# Patient Record
Sex: FEMALE | Race: BLACK OR AFRICAN AMERICAN | NOT HISPANIC OR LATINO
[De-identification: names, ages, dates, MRNs, and addresses within clinical notes are randomized per-mention and may not be internally consistent; named-entity substitution may affect disease eponyms.]

---

## 2019-06-18 ENCOUNTER — APPOINTMENT (OUTPATIENT)
Dept: MAMMOGRAPHY | Facility: IMAGING CENTER | Age: 49
End: 2019-06-18
Payer: COMMERCIAL

## 2019-06-18 ENCOUNTER — OUTPATIENT (OUTPATIENT)
Dept: OUTPATIENT SERVICES | Facility: HOSPITAL | Age: 49
LOS: 1 days | End: 2019-06-18
Payer: COMMERCIAL

## 2019-06-18 ENCOUNTER — APPOINTMENT (OUTPATIENT)
Dept: ULTRASOUND IMAGING | Facility: IMAGING CENTER | Age: 49
End: 2019-06-18
Payer: COMMERCIAL

## 2019-06-18 DIAGNOSIS — R92.8 OTHER ABNORMAL AND INCONCLUSIVE FINDINGS ON DIAGNOSTIC IMAGING OF BREAST: ICD-10-CM

## 2019-06-18 PROCEDURE — 77066 DX MAMMO INCL CAD BI: CPT

## 2019-06-18 PROCEDURE — G0279: CPT

## 2019-06-18 PROCEDURE — 77067 SCR MAMMO BI INCL CAD: CPT | Mod: 26

## 2019-06-18 PROCEDURE — 77063 BREAST TOMOSYNTHESIS BI: CPT | Mod: 26

## 2019-06-18 PROCEDURE — 77063 BREAST TOMOSYNTHESIS BI: CPT

## 2019-06-18 PROCEDURE — 77067 SCR MAMMO BI INCL CAD: CPT

## 2020-08-23 ENCOUNTER — RESULT REVIEW (OUTPATIENT)
Age: 50
End: 2020-08-23

## 2021-07-23 ENCOUNTER — EMERGENCY (EMERGENCY)
Facility: HOSPITAL | Age: 51
LOS: 1 days | Discharge: HOME | End: 2021-07-23
Attending: EMERGENCY MEDICINE | Admitting: STUDENT IN AN ORGANIZED HEALTH CARE EDUCATION/TRAINING PROGRAM
Payer: COMMERCIAL

## 2021-07-23 VITALS
DIASTOLIC BLOOD PRESSURE: 82 MMHG | TEMPERATURE: 98 F | SYSTOLIC BLOOD PRESSURE: 156 MMHG | HEART RATE: 88 BPM | RESPIRATION RATE: 18 BRPM | OXYGEN SATURATION: 100 %

## 2021-07-23 PROCEDURE — 71046 X-RAY EXAM CHEST 2 VIEWS: CPT | Mod: 26

## 2021-07-23 PROCEDURE — 93010 ELECTROCARDIOGRAM REPORT: CPT | Mod: 59

## 2021-07-23 RX ORDER — ACETAMINOPHEN 500 MG
650 TABLET ORAL ONCE
Refills: 0 | Status: COMPLETED | OUTPATIENT
Start: 2021-07-23 | End: 2021-07-23

## 2021-07-23 RX ORDER — IBUPROFEN 200 MG
600 TABLET ORAL ONCE
Refills: 0 | Status: COMPLETED | OUTPATIENT
Start: 2021-07-23 | End: 2021-07-23

## 2021-07-23 NOTE — ED ADULT NURSE NOTE - OBJECTIVE STATEMENT
Patient arrives to the ED for chest pain that started this AM. A&Ox4. Denies any headache, dizziness, worsening SOB, nausea, vomiting, or radiating pain. UCG running at this time. Patient breathing even and nonlabored. 20g IV placed to left arm. Labs sent as ordered. Patient to be medicated as ordered. Safety maintained. Patient stable upon exiting the room.

## 2021-07-23 NOTE — ED PROVIDER NOTE - CARE PLAN
Principal Discharge DX:	Chest pain   Principal Discharge DX:	Chest pain  Secondary Diagnosis:	Symptomatic anemia

## 2021-07-23 NOTE — ED ADULT TRIAGE NOTE - CHIEF COMPLAINT QUOTE
Pt c/o of sudden onset of midsternal chest pain, nonradiating. States to endorse chronic intermittent sob. Denies n/v/d, fever/chills, sob. Seen in UC and received 2 aspirin around 6pm.

## 2021-07-23 NOTE — ED PROVIDER NOTE - CLINICAL SUMMARY MEDICAL DECISION MAKING FREE TEXT BOX
50 year old female with hx appendectomy presents with chest pain. HEART score 2 for age and fam hx. r/o ACS, pneumo. Discussed analgesia options with pt who requests tylenol and ibuprofen.

## 2021-07-23 NOTE — ED PROVIDER NOTE - NSFOLLOWUPINSTRUCTIONS_ED_ALL_ED_FT
Advance activity as tolerated.    Continue all previously prescribed medications as directed unless otherwise instructed.      Take ibuprofen 600mg every 8 hours as needed for pain and to reduce inflammation - take with food    May also take tylenol 500mg every 8 hours as needed for pain.    Follow up with your primary care physician in 48-72 hours - bring copies of your results.      Follow up with a cardiologist within one week - see attached list    Return to the ER for worsening or persistent symptoms including shortness of breath, pressure on your chest, weakness, and/or ANY NEW OR CONCERNING SYMPTOMS.     If you have issues obtaining follow up, please call: [phone] to obtain a doctor or specialist who takes your insurance in your area.  You may call [phone] to make an appointment with the internal medicine clinic.

## 2021-07-23 NOTE — ED PROVIDER NOTE - PROGRESS NOTE DETAILS
SKY ANTUNEZ: Hgb found to be 7.6.  Pt reports intermittent palpitations and SOB, separate from chest pain.  She notes heavy menstrual periods 2/2 known h/o fibroids.  Pt denies any black or bloody stools.  No use of blood thinners.  She states she has a known h/o anemia; intermittently takes iron supplements.  Pt placed in CDU for blood transfusion.

## 2021-07-23 NOTE — ED PROVIDER NOTE - OBJECTIVE STATEMENT
50 year old female with PMH appendectomy presents with chest pain. Reports that midsternal chest pain awoke her from sleep this morning 50 year old female with hx appendectomy presents with chest pain. Reports that 9/10 midsternal chest pain awoke her from sleep this morning, is worse with exhalation and with outstretching and backward movement of arms. "It felt like a pain in my muscle." Pain is now 7/10. Went to urgent care this afternoon who gave her  @1800 and recommended that she come to ED. Since a child pt has had episodes of fluttering sensation in chest followed by brief period of SOB - no such episodes in at least several days. Mother with pacemaker and stent, first cardiac intervention may have been when she was 44; mother still alive at 86 now. Denies worsening with walking, n/v/d/fever, chills, recent illness, tobacco, recreational drug use, daily alcohol use, dysuria. LMP 6/25

## 2021-07-24 ENCOUNTER — APPOINTMENT (OUTPATIENT)
Dept: MAMMOGRAPHY | Facility: IMAGING CENTER | Age: 51
End: 2021-07-24

## 2021-07-24 ENCOUNTER — OUTPATIENT (OUTPATIENT)
Dept: OUTPATIENT SERVICES | Facility: HOSPITAL | Age: 51
LOS: 1 days | End: 2021-07-24

## 2021-07-24 VITALS
DIASTOLIC BLOOD PRESSURE: 75 MMHG | RESPIRATION RATE: 17 BRPM | SYSTOLIC BLOOD PRESSURE: 133 MMHG | HEART RATE: 80 BPM | TEMPERATURE: 98 F | OXYGEN SATURATION: 100 %

## 2021-07-24 DIAGNOSIS — Z90.49 ACQUIRED ABSENCE OF OTHER SPECIFIED PARTS OF DIGESTIVE TRACT: Chronic | ICD-10-CM

## 2021-07-24 DIAGNOSIS — Z00.8 ENCOUNTER FOR OTHER GENERAL EXAMINATION: ICD-10-CM

## 2021-07-24 LAB
ALBUMIN SERPL ELPH-MCNC: 4.3 G/DL — SIGNIFICANT CHANGE UP (ref 3.3–5)
ALP SERPL-CCNC: 42 U/L — SIGNIFICANT CHANGE UP (ref 40–120)
ALT FLD-CCNC: 7 U/L — SIGNIFICANT CHANGE UP (ref 4–33)
ANION GAP SERPL CALC-SCNC: 12 MMOL/L — SIGNIFICANT CHANGE UP (ref 7–14)
AST SERPL-CCNC: 14 U/L — SIGNIFICANT CHANGE UP (ref 4–32)
B PERT DNA SPEC QL NAA+PROBE: SIGNIFICANT CHANGE UP
BASOPHILS # BLD AUTO: 0.18 K/UL — SIGNIFICANT CHANGE UP (ref 0–0.2)
BASOPHILS NFR BLD AUTO: 2.7 % — HIGH (ref 0–2)
BILIRUB SERPL-MCNC: <0.2 MG/DL — SIGNIFICANT CHANGE UP (ref 0.2–1.2)
BLD GP AB SCN SERPL QL: NEGATIVE — SIGNIFICANT CHANGE UP
BUN SERPL-MCNC: 12 MG/DL — SIGNIFICANT CHANGE UP (ref 7–23)
C PNEUM DNA SPEC QL NAA+PROBE: SIGNIFICANT CHANGE UP
CALCIUM SERPL-MCNC: 9.2 MG/DL — SIGNIFICANT CHANGE UP (ref 8.4–10.5)
CHLORIDE SERPL-SCNC: 104 MMOL/L — SIGNIFICANT CHANGE UP (ref 98–107)
CO2 SERPL-SCNC: 21 MMOL/L — LOW (ref 22–31)
CREAT SERPL-MCNC: 0.66 MG/DL — SIGNIFICANT CHANGE UP (ref 0.5–1.3)
EOSINOPHIL # BLD AUTO: 0 K/UL — SIGNIFICANT CHANGE UP (ref 0–0.5)
EOSINOPHIL NFR BLD AUTO: 0 % — SIGNIFICANT CHANGE UP (ref 0–6)
FLUAV SUBTYP SPEC NAA+PROBE: SIGNIFICANT CHANGE UP
FLUBV RNA SPEC QL NAA+PROBE: SIGNIFICANT CHANGE UP
GLUCOSE SERPL-MCNC: 81 MG/DL — SIGNIFICANT CHANGE UP (ref 70–99)
HADV DNA SPEC QL NAA+PROBE: SIGNIFICANT CHANGE UP
HCOV 229E RNA SPEC QL NAA+PROBE: SIGNIFICANT CHANGE UP
HCOV HKU1 RNA SPEC QL NAA+PROBE: SIGNIFICANT CHANGE UP
HCOV NL63 RNA SPEC QL NAA+PROBE: SIGNIFICANT CHANGE UP
HCOV OC43 RNA SPEC QL NAA+PROBE: SIGNIFICANT CHANGE UP
HCT VFR BLD CALC: 26.4 % — LOW (ref 34.5–45)
HCT VFR BLD CALC: 29.7 % — LOW (ref 34.5–45)
HGB BLD-MCNC: 7.6 G/DL — LOW (ref 11.5–15.5)
HGB BLD-MCNC: 8.7 G/DL — LOW (ref 11.5–15.5)
HMPV RNA SPEC QL NAA+PROBE: SIGNIFICANT CHANGE UP
HPIV1 RNA SPEC QL NAA+PROBE: SIGNIFICANT CHANGE UP
HPIV2 RNA SPEC QL NAA+PROBE: SIGNIFICANT CHANGE UP
HPIV3 RNA SPEC QL NAA+PROBE: SIGNIFICANT CHANGE UP
HPIV4 RNA SPEC QL NAA+PROBE: SIGNIFICANT CHANGE UP
IANC: 3.97 K/UL — SIGNIFICANT CHANGE UP (ref 1.5–8.5)
LYMPHOCYTES # BLD AUTO: 1.03 K/UL — SIGNIFICANT CHANGE UP (ref 1–3.3)
LYMPHOCYTES # BLD AUTO: 15.3 % — SIGNIFICANT CHANGE UP (ref 13–44)
MCHC RBC-ENTMCNC: 20.2 PG — LOW (ref 27–34)
MCHC RBC-ENTMCNC: 20.9 PG — LOW (ref 27–34)
MCHC RBC-ENTMCNC: 28.8 GM/DL — LOW (ref 32–36)
MCHC RBC-ENTMCNC: 29.3 GM/DL — LOW (ref 32–36)
MCV RBC AUTO: 70.2 FL — LOW (ref 80–100)
MCV RBC AUTO: 71.2 FL — LOW (ref 80–100)
MONOCYTES # BLD AUTO: 0.18 K/UL — SIGNIFICANT CHANGE UP (ref 0–0.9)
MONOCYTES NFR BLD AUTO: 2.7 % — SIGNIFICANT CHANGE UP (ref 2–14)
NEUTROPHILS # BLD AUTO: 5.17 K/UL — SIGNIFICANT CHANGE UP (ref 1.8–7.4)
NEUTROPHILS NFR BLD AUTO: 76.6 % — SIGNIFICANT CHANGE UP (ref 43–77)
NRBC # BLD: 0 /100 WBCS — SIGNIFICANT CHANGE UP
NRBC # FLD: 0 K/UL — SIGNIFICANT CHANGE UP
PLATELET # BLD AUTO: 209 K/UL — SIGNIFICANT CHANGE UP (ref 150–400)
PLATELET # BLD AUTO: 224 K/UL — SIGNIFICANT CHANGE UP (ref 150–400)
POTASSIUM SERPL-MCNC: 3.8 MMOL/L — SIGNIFICANT CHANGE UP (ref 3.5–5.3)
POTASSIUM SERPL-SCNC: 3.8 MMOL/L — SIGNIFICANT CHANGE UP (ref 3.5–5.3)
PROT SERPL-MCNC: 7.5 G/DL — SIGNIFICANT CHANGE UP (ref 6–8.3)
RAPID RVP RESULT: SIGNIFICANT CHANGE UP
RBC # BLD: 3.76 M/UL — LOW (ref 3.8–5.2)
RBC # BLD: 4.17 M/UL — SIGNIFICANT CHANGE UP (ref 3.8–5.2)
RBC # FLD: 20.3 % — HIGH (ref 10.3–14.5)
RBC # FLD: 22.2 % — HIGH (ref 10.3–14.5)
RH IG SCN BLD-IMP: POSITIVE — SIGNIFICANT CHANGE UP
RH IG SCN BLD-IMP: POSITIVE — SIGNIFICANT CHANGE UP
RSV RNA SPEC QL NAA+PROBE: SIGNIFICANT CHANGE UP
RV+EV RNA SPEC QL NAA+PROBE: SIGNIFICANT CHANGE UP
SARS-COV-2 RNA SPEC QL NAA+PROBE: SIGNIFICANT CHANGE UP
SODIUM SERPL-SCNC: 137 MMOL/L — SIGNIFICANT CHANGE UP (ref 135–145)
TROPONIN T, HIGH SENSITIVITY RESULT: <6 NG/L — SIGNIFICANT CHANGE UP
WBC # BLD: 5.33 K/UL — SIGNIFICANT CHANGE UP (ref 3.8–10.5)
WBC # BLD: 6.75 K/UL — SIGNIFICANT CHANGE UP (ref 3.8–10.5)
WBC # FLD AUTO: 5.33 K/UL — SIGNIFICANT CHANGE UP (ref 3.8–10.5)
WBC # FLD AUTO: 6.75 K/UL — SIGNIFICANT CHANGE UP (ref 3.8–10.5)

## 2021-07-24 PROCEDURE — 99236 HOSP IP/OBS SAME DATE HI 85: CPT | Mod: 25

## 2021-07-24 RX ORDER — DOCUSATE SODIUM 100 MG
1 CAPSULE ORAL
Qty: 20 | Refills: 0
Start: 2021-07-24

## 2021-07-24 RX ORDER — FERROUS SULFATE 325(65) MG
1 TABLET ORAL
Qty: 60 | Refills: 0
Start: 2021-07-24 | End: 2021-08-22

## 2021-07-24 RX ORDER — ASCORBIC ACID 60 MG
1 TABLET,CHEWABLE ORAL
Qty: 60 | Refills: 0
Start: 2021-07-24 | End: 2021-08-22

## 2021-07-24 RX ADMIN — Medication 600 MILLIGRAM(S): at 00:04

## 2021-07-24 RX ADMIN — Medication 650 MILLIGRAM(S): at 00:04

## 2021-07-24 NOTE — ED CDU PROVIDER DISPOSITION NOTE - NSFOLLOWUPINSTRUCTIONS_ED_ALL_ED_FT
Take ferrous sulfate 325mg 2x/day.  Take vitamin C 500mg 2x/day.  Take colace 200mg 2x/day as needed for constipation.  Follow up with your PCP in 2 days.  Follow up with your ob/gyn within 1-2 weeks.  Return to ED for any worsening bleeding, dizziness, chest pain, shortness of breath or any other concerning symptoms.      Iron Deficiency Anemia    WHAT YOU NEED TO KNOW:    What is iron deficiency anemia (NAVNEET)? NAVNEET means you have low red blood cell and hemoglobin levels. Hemoglobin is part of red blood cells and helps carry oxygen to your body. Iron helps make hemoglobin. NAVNEET is caused by a lack of iron in the blood. Blood loss and not enough iron in the foods you eat are the most common causes of low iron.    What increases my risk for NAVNEET?   •A woman's monthly period      •Donating blood more than 5 times a year      •Pregnancy and breastfeeding      •A vegan diet      •NSAIDs such as ibuprofen or aspirin      •Trauma or bleeding in your intestines      What are the signs and symptoms of NAVNEET?   •Feeling weak, tired, or irritable      •Pale skin      •Headache, dizziness      •Shortness of breath with activity      •Fast or uneven heartbeat      •Sore or swollen tongue and mouth      •Nails that break easily      •An urge to eat ice, paint, starch, or dirt      How is NAVNEET diagnosed?   •Blood tests will show how much iron is in your blood and how your body uses the iron.      •A bowel movement sample will show any blood in your bowel movement.      •An endoscopy may show bleeding in your esophagus or stomach. An endoscope is a bendable tube with a light and camera on the end. It is put into your esophagus through your mouth and throat.  Upper Endoscopy           •A colonoscopy may show bleeding in your intestines. A scope is put into your rectum.             How is NAVNEET treated? Treatment may take 3 to 6 months. You may need medicines and supplements to increase the amount of iron in your blood. Ask your healthcare provider how much iron you should take each day. A blood transfusion may be needed if your anemia is severe. This will help replace the blood and iron you have lost.    How can I manage my symptoms?   •Eat foods rich in iron and protein. Nuts, meat, dark leafy green vegetables, and beans are high in iron and protein. Limit milk to 2 cups a day. The calcium in milk can interfere with how your body absorbs iron. Take the iron supplement with food or a drink that is high in vitamin C. This helps your body absorb the iron. You may need to meet with a dietitian to create the right food plan for you.  Sources of Iron       Sources of Vitamin C       Sources of Protein           •Drink liquids as directed. Iron supplements may cause constipation. Liquids help prevent constipation. Ask how much liquid to drink each day and which liquids are best for you.      When should I seek immediate care?   •You have dark or bloody bowel movements.      •You vomit blood.      •You are too dizzy to stand up.      •You have trouble swallowing because of the pain in your mouth and throat.      When should I call my doctor?   •You have heartburn, constipation, or diarrhea.      •You have nausea or are vomiting.      •You are dizzy or very tired.      •You have questions or concerns about your condition or care.

## 2021-07-24 NOTE — ED CDU PROVIDER DISPOSITION NOTE - PATIENT PORTAL LINK FT
You can access the FollowMyHealth Patient Portal offered by [facility] by registering at the following website: [email or web address]. By joining [facility]’s FollowMyHealth portal, you will also be able to view your health information using other applications (apps) compatible with our system.

## 2021-07-24 NOTE — ED CDU PROVIDER INITIAL DAY NOTE - ATTENDING CONTRIBUTION TO CARE
I have personally performed a history and physical examination of the patient and discussed management with the ACP as well as the patient.  I reviewed the ACP's note and agree with the documented findings and plan of care.  I have authored and modified critical sections of the Provider Note, including but not limited to HPI, Physical Exam and MDM.    49 y/o F PMHx anemia, appendectomy, uterine fibroids p/w chest pain yesterday.  Pt reports developing midsternal chest pain, 9/10, midsternal, nonradiating which worsens with movement and exhalation, reporting that pain feels like it is muscular.  Pt went to urgent care where she was directed to ED for evaluation. In ED, pt found to have Hgb of 7.6.  Pt placed in CDU for blood transfusion. Pt s/p 1xPRBC overnight. Currently pt is asymptomatic. Will repeat CBC today.

## 2021-07-24 NOTE — ED CDU PROVIDER DISPOSITION NOTE - ATTENDING CONTRIBUTION TO CARE
I have personally performed a face to face diagnostic evaluation on this patient. I have reviewed the ACP note and agree with the history, exam and plan of care, except as noted.     Attending Contribution to Care: I have personally performed a history and physical examination of the patient and discussed management with the ACP as well as the patient.  I reviewed the ACP's note and agree with the documented findings and plan of care.  I have authored and modified critical sections of the Provider Note, including but not limited to HPI, Physical Exam and MDM.    51 y/o F PMHx anemia, appendectomy, uterine fibroids p/w chest pain yesterday.  Pt reports developing midsternal chest pain, 9/10, midsternal, nonradiating which worsens with movement and exhalation, reporting that pain feels like it is muscular.  Pt went to urgent care where she was directed to ED for evaluation. In ED, pt found to have Hgb of 7.6.  Pt placed in CDU for blood transfusion. Pt s/p 1xPRBC overnight. Currently pt is asymptomatic. Repeat CBC s/p transfusion increased from 7.6 to 8.7. Pt hemodynamically stable at this time. Plan discussed with patient who verbalized understanding of strict return precautions for persistent or returning symptoms, as well as but not limited to signs of severe anemia. Pt to follow with pcp and private gyn.

## 2021-07-24 NOTE — ED CDU PROVIDER INITIAL DAY NOTE - OBJECTIVE STATEMENT
Pt is a 51 y/o F PMHx anemia, appendectomy, uterine fibroids p/w chest pain yesterday.  Pt reports developing midsternal chest pain, 9/10, midsternal, nonradiating which worsens with movement and exhalation, reporting that pain feels like it is muscular.  Pt went to urgent care where she was directed to ED for evaluation.  Pt also reports intermittent heart racing palpitations and shortness of breath, separate from her chest pain.  Pt reports menstrual periods are heavy 2/2 known h/o fibroids.  She reports she is intermittently compliant with iron supplements.  Pt denies any fevers, chills, nausea, vomiting, active vaginal bleeding, melena, brbpr, use of blood thinners, syncope, h/o blood transfusions, calf pain/swelling, h/o dvt/pe, hemoptysis, recent surgeries, h/o malignancy, OCP use, illicit drug use.  In ED, pt found to have Hgb of 7.6.  Pt placed in CDU for blood transfusion.

## 2021-07-24 NOTE — ED CDU PROVIDER INITIAL DAY NOTE - PROGRESS NOTE DETAILS
No acute events overnight. Pt symptoms resolved s/p 1xPRBC. Pt pending repeat CBC this am for trending. Will reassess need for additional units pending results. Pt feeling better, H/H 8.7/29.7.  Pt ambulatory, vitals stable.  will dc home.

## 2021-07-24 NOTE — ED ADULT NURSE REASSESSMENT NOTE - NS ED NURSE REASSESS COMMENT FT1
Patient reporting her chest pain has improved. Patient breathing even and nonlabored. Labs sent as ordered. Safety maintained. Patient stable upon exiting the room

## 2021-07-24 NOTE — ED CDU PROVIDER DISPOSITION NOTE - CLINICAL COURSE
49 y/o F PMHx anemia, appendectomy, uterine fibroids p/w chest pain yesterday.  Pt reports developing midsternal chest pain, 9/10, midsternal, nonradiating which worsens with movement and exhalation, reporting that pain feels like it is muscular.  Pt went to urgent care where she was directed to ED for evaluation.  Pt also reports intermittent heart racing palpitations and shortness of breath, separate from her chest pain.  Pt reports menstrual periods are heavy 2/2 known h/o fibroids.  She reports she is intermittently compliant with iron supplements.  Pt denies any fevers, chills, nausea, vomiting, active vaginal bleeding, melena, brbpr, use of blood thinners, syncope, h/o blood transfusions, calf pain/swelling, h/o dvt/pe, hemoptysis, recent surgeries, h/o malignancy, OCP use, illicit drug use.  In ED, pt found to have Hgb of 7.6.  Pt placed was placed in CDU for blood transfusion.  Pt received one unit, H/H 8.7/29.7, feels better, chest pain resolved, vitals stable, will dc home with iron supplements.

## 2021-08-23 PROBLEM — D64.9 ANEMIA, UNSPECIFIED: Chronic | Status: ACTIVE | Noted: 2021-07-24

## 2021-08-23 PROBLEM — D21.9 BENIGN NEOPLASM OF CONNECTIVE AND OTHER SOFT TISSUE, UNSPECIFIED: Chronic | Status: ACTIVE | Noted: 2021-07-24

## 2021-11-04 ENCOUNTER — APPOINTMENT (OUTPATIENT)
Dept: OBGYN | Facility: CLINIC | Age: 51
End: 2021-11-04
Payer: COMMERCIAL

## 2021-11-04 VITALS
BODY MASS INDEX: 25.83 KG/M2 | HEIGHT: 65 IN | WEIGHT: 155 LBS | SYSTOLIC BLOOD PRESSURE: 120 MMHG | DIASTOLIC BLOOD PRESSURE: 70 MMHG

## 2021-11-04 DIAGNOSIS — Z00.00 ENCOUNTER FOR GENERAL ADULT MEDICAL EXAMINATION W/OUT ABNORMAL FINDINGS: ICD-10-CM

## 2021-11-04 DIAGNOSIS — Z82.49 FAMILY HISTORY OF ISCHEMIC HEART DISEASE AND OTHER DISEASES OF THE CIRCULATORY SYSTEM: ICD-10-CM

## 2021-11-04 DIAGNOSIS — N92.1 EXCESSIVE AND FREQUENT MENSTRUATION WITH IRREGULAR CYCLE: ICD-10-CM

## 2021-11-04 DIAGNOSIS — Z83.438 FAMILY HISTORY OF OTHER DISORDER OF LIPOPROTEIN METABOLISM AND OTHER LIPIDEMIA: ICD-10-CM

## 2021-11-04 DIAGNOSIS — Z86.2 PERSONAL HISTORY OF DISEASES OF THE BLOOD AND BLOOD-FORMING ORGANS AND CERTAIN DISORDERS INVOLVING THE IMMUNE MECHANISM: ICD-10-CM

## 2021-11-04 DIAGNOSIS — Z01.411 ENCOUNTER FOR GYNECOLOGICAL EXAMINATION (GENERAL) (ROUTINE) WITH ABNORMAL FINDINGS: ICD-10-CM

## 2021-11-04 PROCEDURE — 36415 COLL VENOUS BLD VENIPUNCTURE: CPT

## 2021-11-04 PROCEDURE — 99386 PREV VISIT NEW AGE 40-64: CPT | Mod: 25

## 2021-11-04 PROCEDURE — 58100 BIOPSY OF UTERUS LINING: CPT

## 2021-11-04 NOTE — PROCEDURE
[Endometrial Biopsy] : Endometrial biopsy [Irregular Bleeding] : irregular uterine bleeding [Risks] : risks [Benefits] : benefits [Alternatives] : alternatives [Patient] : patient [Infection] : infection [Bleeding] : bleeding [Uterine Perforation] : uterine perforation [No Premedication] : No premedication [None] : none [Easy Passage] : Easy passage [Moderate] : moderate [Sent to Pathology] : placed in buffered formalin and sent for pathology [Tolerated Well] : Patient tolerated the procedure well [No Complications] : No complications [de-identified] : allis

## 2021-11-04 NOTE — PLAN
[FreeTextEntry1] : 50 year old female presents for annual exam with menomet\par \par 1. HCM \par - PAP done today\par - mammogram Rx given\par - patient has colonoscopy referral\par \par 2. Menomet \par - TSH and FSH done today\par - pelvic sonogram given\par - endometrial biopsy

## 2021-11-04 NOTE — PHYSICAL EXAM
[Appropriately responsive] : appropriately responsive [Alert] : alert [No Acute Distress] : no acute distress [No Lymphadenopathy] : no lymphadenopathy [Regular Rate Rhythm] : regular rate rhythm [No Murmurs] : no murmurs [Clear to Auscultation B/L] : clear to auscultation bilaterally [Soft] : soft [Non-tender] : non-tender [Non-distended] : non-distended [No HSM] : No HSM [No Lesions] : no lesions [No Mass] : no mass [Oriented x3] : oriented x3 [Examination Of The Breasts] : a normal appearance [No Masses] : no breast masses were palpable [Labia Majora] : normal [Labia Minora] : normal [Normal] : normal [Uterine Adnexae] : normal [Chaperone Declined] : Patient declined chaperone

## 2021-11-04 NOTE — HISTORY OF PRESENT ILLNESS
[Y] : Positive pregnancy history [Currently Active] : currently active [Men] : men [Vaginal] : vaginal [FreeTextEntry1] : 50 year old female presents to establish care and annual exam. Patient has been experiencing irregular periods for the past year. She did not get her period in September 2021. She had spotting in October 2021. In the past year, she had periods 15 - 41 days apart with most cycles in a normal range, cycles have been 26, 29, 28, 38, 15, 41 and 25 days apart prior to August. On son , 29 YO, one son buying house. [Mammogramdate] : 2019 [PapSmeardate] : 2020 [TextBox_43] : none. [LMPDate] : 8/06/2021 [MensesFreq] : 3 [PGHxTotal] : 2 [[facility]] : 2 [TextBox_6] : 08/06/2021 [TextBox_9] : 14 [TextBox_13] : 3

## 2021-11-04 NOTE — END OF VISIT
[FreeTextEntry3] : I, Gualberto Holcomb, acted solely as a scribe for Dr. Faiza Maloney M.D. on this date 11/04/2021  .\par  \par All medical record entries made by the Scribe were at my, Dr. Faiza Maloney M.D., direction and personally dictated by me on 11/04/2021 . I have reviewed the chart and agree that the record accurately reflects my personal performance of the history, physical exam, assessment and plan. I have also personally directed, reviewed, and agreed with the chart.

## 2021-11-05 LAB
FSH SERPL-MCNC: 63.1 IU/L
TSH SERPL-ACNC: 2.3 UIU/ML

## 2021-11-08 LAB — HPV HIGH+LOW RISK DNA PNL CVX: NOT DETECTED

## 2021-11-12 LAB
CORE LAB BIOPSY: NORMAL
CYTOLOGY CVX/VAG DOC THIN PREP: ABNORMAL

## 2022-01-15 ENCOUNTER — APPOINTMENT (OUTPATIENT)
Dept: MAMMOGRAPHY | Facility: IMAGING CENTER | Age: 52
End: 2022-01-15
Payer: COMMERCIAL

## 2022-01-15 ENCOUNTER — APPOINTMENT (OUTPATIENT)
Dept: ULTRASOUND IMAGING | Facility: IMAGING CENTER | Age: 52
End: 2022-01-15
Payer: COMMERCIAL

## 2022-01-15 ENCOUNTER — RESULT REVIEW (OUTPATIENT)
Age: 52
End: 2022-01-15

## 2022-01-15 ENCOUNTER — OUTPATIENT (OUTPATIENT)
Dept: OUTPATIENT SERVICES | Facility: HOSPITAL | Age: 52
LOS: 1 days | End: 2022-01-15
Payer: COMMERCIAL

## 2022-01-15 DIAGNOSIS — Z00.8 ENCOUNTER FOR OTHER GENERAL EXAMINATION: ICD-10-CM

## 2022-01-15 DIAGNOSIS — Z90.49 ACQUIRED ABSENCE OF OTHER SPECIFIED PARTS OF DIGESTIVE TRACT: Chronic | ICD-10-CM

## 2022-01-15 PROCEDURE — 77067 SCR MAMMO BI INCL CAD: CPT

## 2022-01-15 PROCEDURE — 76856 US EXAM PELVIC COMPLETE: CPT | Mod: 26

## 2022-01-15 PROCEDURE — 76830 TRANSVAGINAL US NON-OB: CPT | Mod: 26

## 2022-01-15 PROCEDURE — 76856 US EXAM PELVIC COMPLETE: CPT

## 2022-01-15 PROCEDURE — 76830 TRANSVAGINAL US NON-OB: CPT

## 2022-01-15 PROCEDURE — 77067 SCR MAMMO BI INCL CAD: CPT | Mod: 26

## 2022-01-15 PROCEDURE — 77063 BREAST TOMOSYNTHESIS BI: CPT

## 2022-01-15 PROCEDURE — 77063 BREAST TOMOSYNTHESIS BI: CPT | Mod: 26

## 2022-01-18 DIAGNOSIS — N63.10 UNSPECIFIED LUMP IN THE RIGHT BREAST, UNSPECIFIED QUADRANT: ICD-10-CM

## 2022-01-27 ENCOUNTER — RESULT REVIEW (OUTPATIENT)
Age: 52
End: 2022-01-27

## 2022-01-27 ENCOUNTER — OUTPATIENT (OUTPATIENT)
Dept: OUTPATIENT SERVICES | Facility: HOSPITAL | Age: 52
LOS: 1 days | End: 2022-01-27
Payer: COMMERCIAL

## 2022-01-27 ENCOUNTER — APPOINTMENT (OUTPATIENT)
Dept: ULTRASOUND IMAGING | Facility: IMAGING CENTER | Age: 52
End: 2022-01-27
Payer: COMMERCIAL

## 2022-01-27 DIAGNOSIS — Z90.49 ACQUIRED ABSENCE OF OTHER SPECIFIED PARTS OF DIGESTIVE TRACT: Chronic | ICD-10-CM

## 2022-01-27 DIAGNOSIS — Z00.8 ENCOUNTER FOR OTHER GENERAL EXAMINATION: ICD-10-CM

## 2022-01-27 PROCEDURE — 76642 ULTRASOUND BREAST LIMITED: CPT

## 2022-01-27 PROCEDURE — 76642 ULTRASOUND BREAST LIMITED: CPT | Mod: 26,RT

## 2022-04-18 DIAGNOSIS — A60.00 HERPESVIRAL INFECTION OF UROGENITAL SYSTEM, UNSPECIFIED: ICD-10-CM

## 2022-04-18 DIAGNOSIS — B96.89 ACUTE VAGINITIS: ICD-10-CM

## 2022-04-18 DIAGNOSIS — N76.0 ACUTE VAGINITIS: ICD-10-CM

## 2022-04-18 RX ORDER — VALACYCLOVIR 1 G/1
1 TABLET, FILM COATED ORAL DAILY
Qty: 3 | Refills: 0 | Status: ACTIVE | COMMUNITY
Start: 2022-04-18 | End: 1900-01-01

## 2022-04-18 RX ORDER — METRONIDAZOLE 7.5 MG/G
0.75 GEL VAGINAL
Qty: 1 | Refills: 0 | Status: ACTIVE | COMMUNITY
Start: 2022-04-18 | End: 1900-01-01

## 2022-12-09 RX ORDER — VALACYCLOVIR 1 G/1
1 TABLET, FILM COATED ORAL DAILY
Qty: 5 | Refills: 0 | Status: ACTIVE | COMMUNITY
Start: 2022-12-09 | End: 1900-01-01

## 2023-03-27 ENCOUNTER — APPOINTMENT (OUTPATIENT)
Dept: OBGYN | Facility: CLINIC | Age: 53
End: 2023-03-27
Payer: COMMERCIAL

## 2023-03-27 VITALS
WEIGHT: 173 LBS | HEIGHT: 65 IN | BODY MASS INDEX: 28.82 KG/M2 | SYSTOLIC BLOOD PRESSURE: 140 MMHG | DIASTOLIC BLOOD PRESSURE: 78 MMHG

## 2023-03-27 PROCEDURE — 99396 PREV VISIT EST AGE 40-64: CPT

## 2023-03-27 RX ORDER — CHLORHEXIDINE GLUCONATE, 0.12% ORAL RINSE 1.2 MG/ML
0.12 SOLUTION DENTAL
Qty: 473 | Refills: 0 | Status: ACTIVE | COMMUNITY
Start: 2023-02-12

## 2023-03-27 RX ORDER — AMOXICILLIN 875 MG/1
875 TABLET, FILM COATED ORAL
Qty: 14 | Refills: 0 | Status: ACTIVE | COMMUNITY
Start: 2022-12-04

## 2023-03-27 NOTE — HISTORY OF PRESENT ILLNESS
[FreeTextEntry1] : 53 y/o pt presents for annual exam. Pt reports irregular periods, no bleeding in between periods and no heavy or prolonged periods.  Pt has history of fibroids.\par \par Sh: pt works for [employer] and is currently working in [place]. Grandson is due tomorrow. [Mammogramdate] : 1/22 [PapSmeardate] : 1/22

## 2023-03-27 NOTE — END OF VISIT
[FreeTextEntry3] : I, Sayra Marshalleed, acted as a scribe on behalf of Dr. Faiza Maloney on 03/27/2023 .\par \par All medical entries made by the scribe were at my, Dr. Faiza Maloney, direction and personally dictated by me on 03/27/2023. I have reviewed the chart and agree that the record accurately reflects my personal performance of the history, physical exam, assessment and plan. I have also personally directed, reviewed, and agreed with the chart.

## 2023-03-27 NOTE — PLAN
[FreeTextEntry1] : 51 y/o for annual exam.\par \par 1. HCM\par - pap done today\par - rx for mammo given\par - referral for colon given\par -rx for pelvic sono for h/o fibroids\par - RTO in 1 year\par

## 2023-03-29 LAB — HPV HIGH+LOW RISK DNA PNL CVX: NOT DETECTED

## 2023-03-31 LAB — CYTOLOGY CVX/VAG DOC THIN PREP: ABNORMAL

## 2023-04-17 LAB — CORE LAB BIOPSY: NORMAL

## 2024-01-03 ENCOUNTER — APPOINTMENT (OUTPATIENT)
Dept: MAMMOGRAPHY | Facility: IMAGING CENTER | Age: 54
End: 2024-01-03
Payer: COMMERCIAL

## 2024-01-03 ENCOUNTER — OUTPATIENT (OUTPATIENT)
Dept: OUTPATIENT SERVICES | Facility: HOSPITAL | Age: 54
LOS: 1 days | End: 2024-01-03
Payer: COMMERCIAL

## 2024-01-03 ENCOUNTER — APPOINTMENT (OUTPATIENT)
Dept: ULTRASOUND IMAGING | Facility: IMAGING CENTER | Age: 54
End: 2024-01-03
Payer: COMMERCIAL

## 2024-01-03 ENCOUNTER — RESULT REVIEW (OUTPATIENT)
Age: 54
End: 2024-01-03

## 2024-01-03 DIAGNOSIS — Z90.49 ACQUIRED ABSENCE OF OTHER SPECIFIED PARTS OF DIGESTIVE TRACT: Chronic | ICD-10-CM

## 2024-01-03 DIAGNOSIS — Z00.8 ENCOUNTER FOR OTHER GENERAL EXAMINATION: ICD-10-CM

## 2024-01-03 PROCEDURE — 77067 SCR MAMMO BI INCL CAD: CPT | Mod: 26

## 2024-01-03 PROCEDURE — 76856 US EXAM PELVIC COMPLETE: CPT | Mod: 26

## 2024-01-03 PROCEDURE — 77063 BREAST TOMOSYNTHESIS BI: CPT | Mod: 26

## 2024-01-03 PROCEDURE — 76830 TRANSVAGINAL US NON-OB: CPT | Mod: 26

## 2024-01-03 PROCEDURE — 77063 BREAST TOMOSYNTHESIS BI: CPT

## 2024-01-03 PROCEDURE — 76641 ULTRASOUND BREAST COMPLETE: CPT | Mod: 26,50

## 2024-01-03 PROCEDURE — 76641 ULTRASOUND BREAST COMPLETE: CPT

## 2024-01-03 PROCEDURE — 76856 US EXAM PELVIC COMPLETE: CPT

## 2024-01-03 PROCEDURE — 76830 TRANSVAGINAL US NON-OB: CPT

## 2024-01-03 PROCEDURE — 77067 SCR MAMMO BI INCL CAD: CPT

## 2024-06-26 ENCOUNTER — APPOINTMENT (OUTPATIENT)
Dept: OBGYN | Facility: CLINIC | Age: 54
End: 2024-06-26
Payer: COMMERCIAL

## 2024-06-26 VITALS
DIASTOLIC BLOOD PRESSURE: 85 MMHG | BODY MASS INDEX: 26.66 KG/M2 | HEIGHT: 65 IN | SYSTOLIC BLOOD PRESSURE: 128 MMHG | WEIGHT: 160 LBS

## 2024-06-26 DIAGNOSIS — Z11.51 ENCOUNTER FOR SCREENING FOR HUMAN PAPILLOMAVIRUS (HPV): ICD-10-CM

## 2024-06-26 DIAGNOSIS — Z01.419 ENCOUNTER FOR GYNECOLOGICAL EXAMINATION (GENERAL) (ROUTINE) W/OUT ABNORMAL FINDINGS: ICD-10-CM

## 2024-06-26 PROCEDURE — 99396 PREV VISIT EST AGE 40-64: CPT

## 2024-07-02 LAB
CYTOLOGY CVX/VAG DOC THIN PREP: ABNORMAL
HPV HIGH+LOW RISK DNA PNL CVX: NOT DETECTED